# Patient Record
Sex: MALE | Race: BLACK OR AFRICAN AMERICAN | NOT HISPANIC OR LATINO | Employment: UNEMPLOYED | URBAN - METROPOLITAN AREA
[De-identification: names, ages, dates, MRNs, and addresses within clinical notes are randomized per-mention and may not be internally consistent; named-entity substitution may affect disease eponyms.]

---

## 2021-06-10 ENCOUNTER — HOSPITAL ENCOUNTER (EMERGENCY)
Facility: HOSPITAL | Age: 21
Discharge: HOME | End: 2021-06-10
Attending: EMERGENCY MEDICINE

## 2021-06-10 VITALS
WEIGHT: 145.5 LBS | RESPIRATION RATE: 16 BRPM | HEART RATE: 102 BPM | DIASTOLIC BLOOD PRESSURE: 85 MMHG | SYSTOLIC BLOOD PRESSURE: 139 MMHG | OXYGEN SATURATION: 96 % | TEMPERATURE: 100.1 F

## 2021-06-10 DIAGNOSIS — F20.9 SCHIZOPHRENIA (HCC): Primary | ICD-10-CM

## 2021-06-10 PROCEDURE — 99282 EMERGENCY DEPT VISIT SF MDM: CPT | Performed by: EMERGENCY MEDICINE

## 2021-06-10 PROCEDURE — 99283 EMERGENCY DEPT VISIT LOW MDM: CPT

## 2021-06-10 NOTE — ED NOTES
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

## 2021-06-10 NOTE — ED NOTES
Patient given a soda at this time  Charging patients phone so he can get contact information to reach his girlfriend        Luis Peralta RN  06/10/21 5508

## 2021-06-10 NOTE — ED PROVIDER NOTES
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  Patient has pressured speech  This is a 24-year-old male with a history of schizophrenia who presents the emergency department with EMS and [facility],  [address] for a psychiatric evaluation  Per report, the patient was driving around in circles in the [place] parking lot and acting erratic  On my evaluation, the patient is calm and cooperative  He does exhibit an odd behavior but he is lucid  Patient states that he traveled to [facility] from [address] [place] [address] to visit his girlfriend  States that he left his shoes in [place] because he left in a hurry to see his girlfriend  Denies any alcohol or drug use  Denies any auditory/visual hallucinations  Patient denies taking any medications daily  Denies any homicidal or suicidal ideation  I would never hurt anybody  When asked why he was driving in circles in the [employer] lot, the patient states I just wanted to have fun  Denies fever/chills, nausea/vomiting, lightheadedness/dizziness, numbness/weakness, headache, change in vision, URI symptoms, neck pain, chest pain, palpitations, shortness of breath, cough, back pain, flank pain, abdominal pain, diarrhea, hematochezia, melena, dysuria, hematuria  None       History reviewed  No pertinent past medical history  History reviewed  No pertinent surgical history  History reviewed  No pertinent family history  I have reviewed and agree with the history as documented      E-Cigarette/Vaping     E-Cigarette/Vaping Substances     Social History     Tobacco Use    Smoking status: Never Smoker    Smokeless tobacco: Never Used Substance Use Topics    Alcohol use: Not Currently    Drug use: Not Currently       Review of Systems   Constitutional: Negative for chills, fatigue and fever  HENT: Negative for rhinorrhea, sore throat and trouble swallowing  Eyes: Negative for photophobia and visual disturbance  Respiratory: Negative for cough, chest tightness and shortness of breath  Cardiovascular: Negative for chest pain, palpitations and leg swelling  Gastrointestinal: Negative for abdominal pain, blood in stool, diarrhea, nausea and vomiting  Endocrine: Negative for polyuria  Genitourinary: Negative for dysuria, flank pain and hematuria  Musculoskeletal: Negative for back pain and neck pain  Skin: Negative for color change and rash  Allergic/Immunologic: Negative for immunocompromised state  Neurological: Negative for dizziness, weakness, light-headedness, numbness and headaches  Psychiatric/Behavioral: Negative for hallucinations and suicidal ideas  The patient is not nervous/anxious  All other systems reviewed and are negative  Physical Exam  Physical Exam  Constitutional:       General: He is not in acute distress  Appearance: Normal appearance  He is well-developed  HENT:      Mouth/Throat:      Pharynx: Uvula midline  Eyes:      General: Lids are normal       Conjunctiva/sclera: Conjunctivae normal       Pupils: Pupils are equal, round, and reactive to light  Neck:      Thyroid: No thyroid mass or thyromegaly  Trachea: Trachea normal    Cardiovascular:      Rate and Rhythm: Regular rhythm  Tachycardia present  Pulmonary:      Effort: Pulmonary effort is normal       Breath sounds: Normal breath sounds  Abdominal:      General: There is no distension  Palpations: Abdomen is soft  Skin:     General: Skin is warm and dry  Neurological:      General: No focal deficit present  Mental Status: He is alert  GCS: GCS eye subscore is 4  GCS verbal subscore is 5   GCS motor subscore is 6  Gait: Gait is intact  Gait normal    Psychiatric:         Attention and Perception: Attention and perception normal          Mood and Affect: Mood normal          Speech: Speech normal          Behavior: Behavior normal  Behavior is cooperative  Thought Content: Thought content normal  Thought content does not include homicidal or suicidal ideation  Thought content does not include homicidal or suicidal plan  Vital Signs  ED Triage Vitals [06/10/21 1806]   Temperature Pulse Respirations Blood Pressure SpO2   100 1 °F (37 8 °C) 102 16 139/85 96 %      Temp Source Heart Rate Source Patient Position - Orthostatic VS BP Location FiO2 (%)   Oral Monitor Lying Right arm --      Pain Score       --           Vitals:    06/10/21 1806   BP: 139/85   Pulse: 102   Patient Position - Orthostatic VS: Lying         Visual Acuity      ED Medications  Medications - No data to display    Diagnostic Studies  Results Reviewed     None                 No orders to display              Procedures  Procedures         ED Course  ED Course as of Tomas 10 1930   Thu Tomas 10, 2021   1928 Patient has been calm and cooperative his entire stay  Currently, playing on his phone  He has been unable to reach his girlfriend  Will discharge this time  SBIRT 20yo+      Most Recent Value   SBIRT (22 yo +)   In order to provide better care to our patients, we are screening all of our patients for alcohol and drug use  Would it be okay to ask you these screening questions? Yes Filed at: 06/10/2021 1827   Initial Alcohol Screen: US AUDIT-C    1  How often do you have a drink containing alcohol?  0 Filed at: 06/10/2021 1827   2  How many drinks containing alcohol do you have on a typical day you are drinking? 0 Filed at: 06/10/2021 1827   3a  Male UNDER 65: How often do you have five or more drinks on one occasion? 0 Filed at: 06/10/2021 1827   3b  FEMALE Any Age, or MALE 65+:  How often do you have 4 or more drinks on one occassion? 0 Filed at: 06/10/2021 1827   Audit-C Score  0 Filed at: 06/10/2021 1827   SILAS: How many times in the past year have you    Used an illegal drug or used a prescription medication for non-medical reasons? Never Filed at: 06/10/2021 1827                    MDM  Number of Diagnoses or Management Options  Diagnosis management comments: Patient has a history of schizophrenia  Patient does not pose a risk to himself or others  Will have the patient call his girlfriend to pick him up  Disposition  Final diagnoses:   Schizophrenia ([facility] [address] )     Time reflects when diagnosis was documented in both MDM as applicable and the Disposition within this note     Time User Action Codes Description Comment    6/10/2021  7:30 PM Eunice MATHEW Add [F20 9] Schizophrenia [facility])       ED Disposition     ED Disposition Condition Date/Time Comment    Discharge Stable u Tomas 10, 2021  7:30  [facility] [place] discharge to home/self care  Follow-up Information     Follow up With Specialties Details Why Contact Info Additional [address] [facility] Emergency Department Emergency Medicine Go to  If symptoms worsen [facility] [id number] [address] [facility] Emergency Department, [address] [facility] , [place], [place], [address]          Patient's Medications    No medications on file     No discharge procedures on file      PDMP Review     None          ED Provider  Electronically Signed by           Annmarie Gray MD  06/10/21 5413

## 2021-06-10 NOTE — ED NOTES
Patient keeps taking his t-shirt off and placing it back on         Zeeshan Bardales RN  06/10/21 9239

## 2021-06-10 NOTE — ED NOTES
Patient keeps coming in and out of the room asking how to leave  Redirected to go back to the room and that the provider will be in shortly        Taisha Araya, CLEMENTINE  06/10/21 2153

## 2021-06-10 NOTE — ED NOTES
Patient is back in his room , given back his phone which has been charged        Destiny Alvarado RN  06/10/21 6968